# Patient Record
Sex: MALE | Race: WHITE | NOT HISPANIC OR LATINO | Employment: FULL TIME | RURAL
[De-identification: names, ages, dates, MRNs, and addresses within clinical notes are randomized per-mention and may not be internally consistent; named-entity substitution may affect disease eponyms.]

---

## 2017-05-15 ENCOUNTER — CONVERSION ENCOUNTER (OUTPATIENT)
Dept: FAMILY MEDICINE CLINIC | Facility: CLINIC | Age: 62
End: 2017-05-15

## 2017-11-22 ENCOUNTER — CONVERSION ENCOUNTER (OUTPATIENT)
Dept: FAMILY MEDICINE CLINIC | Facility: CLINIC | Age: 62
End: 2017-11-22

## 2018-01-17 ENCOUNTER — TREATMENT (OUTPATIENT)
Dept: PHYSICAL THERAPY | Facility: CLINIC | Age: 63
End: 2018-01-17

## 2018-01-17 DIAGNOSIS — M54.50 LOW BACK PAIN WITHOUT SCIATICA, UNSPECIFIED BACK PAIN LATERALITY, UNSPECIFIED CHRONICITY: Primary | ICD-10-CM

## 2018-01-17 PROCEDURE — 97110 THERAPEUTIC EXERCISES: CPT | Performed by: PHYSICAL THERAPIST

## 2018-01-17 PROCEDURE — 97035 APP MDLTY 1+ULTRASOUND EA 15: CPT | Performed by: PHYSICAL THERAPIST

## 2018-01-17 PROCEDURE — 97161 PT EVAL LOW COMPLEX 20 MIN: CPT | Performed by: PHYSICAL THERAPIST

## 2018-01-17 NOTE — PROGRESS NOTES
Physical Therapy Initial Evaluation and Plan of Care    TIME IN 11:40 TIME OUT 12:30    Patient: Cuco Salazar   : 1955  Diagnosis/ICD-10 Code:  No primary diagnosis found.  Referring practitioner: No ref. provider found  Date of Initial Visit: 2018  Today's Date: 2018  Patient seen for Visit count could not be calculated. Make sure you are using a visit which is associated with an episode. sessions           Subjective Questionnaire: Oswestry: 54%      Subjective Evaluation    History of Present Illness  Date of onset: 2015  Mechanism of injury: Patient reports he had driven for 2 days and then biked and played tennis. Woke up the next morning and couldn't move.  Spent 2 days in bed.  Since then, intermittent pain and stiffness on right side low back.  Spasms are worst complaint.  Also having left plantar fasciitis after walking a lot on previous vacation.    Subjective comment: Denies numbess or tingling. No giving way.  Worse when he gets out of bed, sitting too long, twists to left with tennis while lunging forward. Saw PMD, 2 PT's, and massage therapist.  No tests yet.Pain  Location: right quadratus/glut med area  Quality: sharp, squeezing and pulling  Relieving factors: change in position (supine, massage ball but now hurting in left side)  Exacerbated by: twist to left with lunge in tennis, sitting rotation, sit too long, walk too far, carry heavy item, landing on running if flared.  Ok with stairs.   Progression: no change    Diagnostic Tests  Abnormal x-ray: 20 years ago, arthritis.    Treatments  Previous treatment: physical therapy and massage  Current treatment: physical therapy  Patient Goals  Patient goals for therapy: decreased pain and increased motion  Patient goal: Decrease spasm and pain           Objective     Special Questions      Additional Special Questions  Denies bowel or bladder dysfunction.  (-) valsalva  No night pain.      Postural Observations    Additional  Postural Observation Details  Right upslip, AINN, outflare.    Palpation     Right Tenderness of the quadratus lumborum.     Additional Palpation Details  Slight tenderness quadratus, none at spine, none at sacrum.    Neurological Testing     Sensation     Lumbar   Left   Intact: light touch    Right   Intact: light touch    Reflexes   Left   Patellar (L4): normal (2+)  Achilles (S1): normal (2+)    Right   Patellar (L4): normal (2+)  Achilles (S1): normal (2+)    Active Range of Motion     Lumbar   Flexion: 90 degrees   Extension: 15 (mild at back of right hip) degrees with pain  Left lateral flexion: 14 degrees with pain  Right lateral flexion: 25 degrees   Left rotation: 25 (%) degrees   Right rotation: 25 degrees     Strength/Myotome Testing     Right Hip   Planes of Motion   Flexion: 4-  Extension: 5  Abduction: 4  External rotation: 4+  Internal rotation: 4    Additional Strength Details  QL 5/5, mild discomfort with stretch.  Pain recreated with supine crossover and return to extension.    Tests       Thoracic   Negative slump.     Lumbar     Right   Negative femoral stretch, passive SLR, lumbar push and valsalva.     Right Pelvic Girdle/Sacrum   Negative: sacrum compression, gapping and thigh thrust.     Right Hip   Negative Gaenslen's.     Additional Tests Details  (-) active SLR         Assessment & Plan     Assessment  Impairments: abnormal muscle firing, abnormal or restricted ROM, activity intolerance, impaired physical strength, lacks appropriate home exercise program and pain with function  Assessment details: Patient is a 62 y.o. male who has had pain and back spasms for 3 years.   He denies any paresthesias or radicular symptoms.  No recent diagnostics, no red flags noted on history or exam.  His pain is mostly in right lower quadrant and hip.  On exam, myotomes and dermatomes intact.  Lumbar spine and hip exam revealed no pain or instability.   He had SI malalignment and weakness in the right hip.  Pain with stretching hip or lumbar area. On palpation, he had mild tenderness around glut med and quadratus.   He presented with a SI dysfunction with right ilial upslip, anterior innominate, and pelvic inflare.     Functional Limitations: carrying objects, lifting and sleeping  Goals  Plan Goals: STG X 2 weeks  1.  Patient will demonstrate correct alignment 50% of the time.  2.  Patient will tolerate initial stabilization exercises.  3.  Increase hip strength 1/2 grade.  4.  Patient will demonstrate correct body mechanics and posture with cueing.  LTG X 6 weeks  1.  Patient to have normal pelvic alignment.  2.  5/5 hip and core strength.  3.  Full ROM with hips and lumbar spine.  4.  (-) special tests.  5.  Patient to return to tennis with tolerable symptoms while staying in normal pelvic alignment.      Plan  Therapy options: will be seen for skilled physical therapy services  Planned modality interventions: cryotherapy, iontophoresis, TENS, high voltage pulsed current (pain management) and ultrasound  Planned therapy interventions: abdominal trunk stabilization, manual therapy, flexibility, home exercise program, stretching, neuromuscular re-education and postural training  Frequency: 1x week  Duration in weeks: 6  Treatment plan discussed with: patient        Manual Therapy:    8     mins  28616;  Therapeutic Exercise:    15     mins  40897;     Neuromuscular Bhavik:         mins  36633;    Therapeutic Activity:           mins  70923;     Gait Training:            mins  57512;     Ultrasound:           mins  67797;    Electrical Stimulation:          mins  99821 ( );  Dry Needling           mins self-pay    Timed Treatment:   23   mins   Total Treatment:     50   mins    PT SIGNATURE: Jesica Medrano, PT   DATE TREATMENT INITIATED: 1/17/2018    Initial Certification  Certification Period: 4/17/2018  I certify that the therapy services are furnished while this patient is under my care.  The services outlined  above are required by this patient, and will be reviewed every 90 days.     PHYSICIAN:       DATE:     Please sign and return via fax to [phone].. Thank you, [facility].

## 2018-01-17 NOTE — PATIENT INSTRUCTIONS
Patient was educated on findings of evaluation, purpose of treatment, and goals for therapy.  Treatment options discussed and questions answered.  Patient was educated on exercises/self treatment/pain relief techniques.  Please view [facility] Thien Salazar for a complete list of HEP instructions.

## 2018-01-24 ENCOUNTER — TREATMENT (OUTPATIENT)
Dept: PHYSICAL THERAPY | Facility: CLINIC | Age: 63
End: 2018-01-24

## 2018-01-24 DIAGNOSIS — M54.50 LOW BACK PAIN WITHOUT SCIATICA, UNSPECIFIED BACK PAIN LATERALITY, UNSPECIFIED CHRONICITY: ICD-10-CM

## 2018-01-24 DIAGNOSIS — M72.2 PLANTAR FASCIITIS, LEFT: Primary | ICD-10-CM

## 2018-01-24 PROCEDURE — 97110 THERAPEUTIC EXERCISES: CPT | Performed by: PHYSICAL THERAPIST

## 2018-01-24 PROCEDURE — 97530 THERAPEUTIC ACTIVITIES: CPT | Performed by: PHYSICAL THERAPIST

## 2018-01-24 PROCEDURE — 97140 MANUAL THERAPY 1/> REGIONS: CPT | Performed by: PHYSICAL THERAPIST

## 2018-01-24 NOTE — PROGRESS NOTES
Physical Therapy Initial Evaluation and Plan of Care    TIME IN 10:30 TIME OUT 11:30    Patient: Thien Salazar   : 1955  Diagnosis/ICD-10 Code:  Plantar fasciitis, left [M72.2]  Referring practitioner: Dr. Gagan Mendes  Date of Initial Visit: 2018  Today's Date: 2018  Patient seen for 1 sessions             Subjective Evaluation    History of Present Illness  Mechanism of injury: During trip a lot of walking 7-12 miles a day.  H/o achilles tendonitis but noticed heel pain after the trip.  Did see a PT and was given stretches. Had custom orthotics.  They are the half arch size and very firm.  Don't fit in his tennis or dress shoes very well. Saw an orthopedic last month. Cortisone shot in arch where there was a knot, no improvement. Shot was more uncomfortable than the plantar fascia.          Patient Occupation: [employer]  Location: left heel  Quality: sharp and burning  Alleviating factors: Crocs at home, plantarfascia resting night sock, wearing orthotics help a little.  Aggravating factors: standing (standing worse than walking.  Wearing dress shoes..)    Diagnostic Tests  X-ray: normal    Treatments  Previous treatment: injection treatment and physical therapy  Current treatment: physical therapy  Patient Goals  Patient goals for therapy: decreased pain  Patient goal: Stand, ADL's, and sports with decreased pain.           Objective       Static Posture     Ankle/Foot   Ankle/Foot (Left): Calcaneovarus, pes cavus and supinated.     Neurological Testing     Sensation     Ankle/Foot   Left Ankle/Foot   Intact: light touch    Right Ankle/Foot   Intact: light touch     Comments   Left light touch: diminished over dorsum of left foot.     Active Range of Motion   Left Ankle/Foot   Dorsiflexion (ke): 15 degrees   Dorsiflexion (kf): 21 degrees   Plantar flexion: 76 degrees   Inversion: 24 degrees   Eversion: 12 degrees     Right Ankle/Foot   Dorsiflexion (ke): 15 degrees   Dorsiflexion (kf): 18  degrees   Plantar flexion: 72 degrees   Inversion: 30 degrees   Eversion: 10 degrees     Strength/Myotome Testing     Left Ankle/Foot   Dorsiflexion: 5  Plantar flexion: 4  Inversion: 4  Eversion: 4+    Right Ankle/Foot   Normal strength    Tests   Left Ankle/Foot   Negative for navicular drop, Tinel's sign (tarsal tunnel) and windlass.     General Comments     Ankle/Foot Comments   No remarkable edema           Assessment & Plan     Assessment  Impairments: activity intolerance, impaired physical strength and pain with function  Assessment details: Patient is a 63 yo old male with c/o left heel pain that has not responded to previous treatment.  He presents with good flexibility in the achilles but does have weakness in the ankle.  He has tenderness along the origin of the plantar fascia at the calcaneus. Based on the above findings, he is a good candidate for therapy.  Prognosis: good  Functional Limitations: walking and standing  Goals  Plan Goals: STG X4 weeks  1. Tolerate new exercises without increase in symptoms.  2.  Decrease tenderness 25%.  3.  Increase ankle stability by 1/2 grade.  LTGX8 weeks  1.  Increase standing time by 30 minutes.  2.  5/5 strength.  3.  Decreased pain and tenderness at least 50%.    Plan  Therapy options: will be seen for skilled physical therapy services  Planned modality interventions: cryotherapy, iontophoresis and ultrasound  Planned therapy interventions: balance/weight-bearing training, manual therapy, stretching and strengthening  Frequency: 1x week  Duration in weeks: 6  Plan details: 6-8 weeks        Manual Therapy:          mins  76303;  Therapeutic Exercise:          mins  96223;     Neuromuscular Bhavik:         mins  62460;    Therapeutic Activity:     30      mins  18874;     Gait Training:            mins  27676;     Ultrasound:     6/8     mins  03996;    Electrical Stimulation:          mins  36501 ( );  Dry Needling           mins self-pay    Timed Treatment:    38   mins   Total Treatment:         mins    PT SIGNATURE: Jesica Medrano, PT   DATE TREATMENT INITIATED: 1/24/2018    Initial Certification  Certification Period: 4/24/2018  I certify that the therapy services are furnished while this patient is under my care.  The services outlined above are required by this patient, and will be reviewed every 90 days.     PHYSICIAN:       DATE:     Please sign and return via fax to [phone].. Thank you, [facility].

## 2018-01-24 NOTE — PROGRESS NOTES
Physical Therapy Daily Progress Note    Time In 10:30  Time Out 11:30    Visit # : 2  Thien Salazar reports: noted anterior soreness.  Less left low back this week.  Feel it with serving to the right.    Subjective     Objective   See Exercise, Manual, and Modality Logs for complete treatment.   Right ASIS and iliac crest superior, malleolus  Slight PSIS superior   No apparent outflare    Assessment/Plan  Chronic quadratus tightness leading to right upslip.  Improved with manual but will superior position on the right.  No increase in spasms with initiation of core exercises, therefore able to advance today to more challenging exercises. Instructed patient in long leg distraction technique for family member  Progress strengthening /stabilization /functional activity  Add PF DFM and taping next visit.  Then patient out of town.         Manual Therapy:   10     mins  16105;  Therapeutic Exercise:    12    mins  59623;     Neuromuscular Bhavik:         mins  31855;    Therapeutic Activity:           mins  51574;     Gait Training:            mins  87142;     Ultrasound:           mins  49474;    Electrical Stimulation:          mins  77931 ( );  Dry Needling           mins self-pay    Timed Treatment:   22   mins   Total Treatment:     60   mins    Jesica Medrano, PT  Physical Therapist

## 2018-01-31 ENCOUNTER — TREATMENT (OUTPATIENT)
Dept: PHYSICAL THERAPY | Facility: CLINIC | Age: 63
End: 2018-01-31

## 2018-01-31 DIAGNOSIS — M54.50 LOW BACK PAIN WITHOUT SCIATICA, UNSPECIFIED BACK PAIN LATERALITY, UNSPECIFIED CHRONICITY: ICD-10-CM

## 2018-01-31 DIAGNOSIS — M72.2 PLANTAR FASCIITIS, LEFT: Primary | ICD-10-CM

## 2018-01-31 PROCEDURE — 97035 APP MDLTY 1+ULTRASOUND EA 15: CPT | Performed by: PHYSICAL THERAPIST

## 2018-01-31 PROCEDURE — 97140 MANUAL THERAPY 1/> REGIONS: CPT | Performed by: PHYSICAL THERAPIST

## 2018-01-31 PROCEDURE — 97014 ELECTRIC STIMULATION THERAPY: CPT | Performed by: PHYSICAL THERAPIST

## 2018-01-31 PROCEDURE — 97110 THERAPEUTIC EXERCISES: CPT | Performed by: PHYSICAL THERAPIST

## 2018-01-31 NOTE — PROGRESS NOTES
Physical Therapy Daily Progress Note    Time In 10:30  Time Out 11:40    Visit # : 3  Thien Salazar reports: occasional back spasms.  Less frequent.  Still random.  Will be with odd movement sitting.  Never when I play tennis.      Subjective     Objective   See Exercise, Manual, and Modality Logs for complete treatment.        Assessment/Plan  Right AINN present but slight.  Quadratus tone with a lot of guarding.  One area of knot in plantar fascia diminished after soft tissue work.  Progress per Plan of Care  Patient out of town and then will assess manual on foot, taping.  Progress core as needed  Waiting on ionto order from Dr. Mendes         Manual Therapy:    20     mins  47259;  Therapeutic Exercise:    15     mins  53988;     Neuromuscular Bhavik:         mins  37924;    Therapeutic Activity:           mins  31360;     Gait Training:            mins  85754;     Ultrasound:     6/8     mins  08928;    Electrical Stimulation:    15     mins  97789 ( );  Dry Needling           mins self-pay    Timed Treatment:   43   mins   Total Treatment:     70   mins    Jesica Medrano, PT  Physical Therapist

## 2018-02-14 ENCOUNTER — TREATMENT (OUTPATIENT)
Dept: PHYSICAL THERAPY | Facility: CLINIC | Age: 63
End: 2018-02-14

## 2018-02-14 DIAGNOSIS — M54.50 LOW BACK PAIN WITHOUT SCIATICA, UNSPECIFIED BACK PAIN LATERALITY, UNSPECIFIED CHRONICITY: ICD-10-CM

## 2018-02-14 DIAGNOSIS — M72.2 PLANTAR FASCIITIS, LEFT: Primary | ICD-10-CM

## 2018-02-14 PROCEDURE — 97140 MANUAL THERAPY 1/> REGIONS: CPT | Performed by: PHYSICAL THERAPIST

## 2018-02-14 PROCEDURE — 97112 NEUROMUSCULAR REEDUCATION: CPT | Performed by: PHYSICAL THERAPIST

## 2018-02-14 PROCEDURE — 97014 ELECTRIC STIMULATION THERAPY: CPT | Performed by: PHYSICAL THERAPIST

## 2018-02-14 NOTE — PROGRESS NOTES
Physical Therapy Daily Progress Note    Time In 11:00  Time Out 11:59    Visit # : 4  Thien Salazar reports: serving is what is hurting my hip.Heat helps but I burnt my back with a heat patch on.  Flying home Saturday and ran through the airport with 45lbs. Overall less spasms.  More foot pain with tennis every day while on vacation.  I won't be playing for a month. Manual improved heel last visit.     Subjective     Objective   See Exercise, Manual, and Modality Logs for complete treatment.       Assessment/Plan  Exacerbated back while on vacation playing tennis.  Quadratus lumborum with extreme tone, tenderness ql and left lumbar paraspinal.  Progress per Plan of Care  Bridge with hamstring curls next visit when acute exacerbation improved.  PNF trunk extenion and right posterior rotation (counter balance serving motion of flexion, anterior innominate rotation  Continue weekly         Manual Therapy:    20    mins  18569;  Therapeutic Exercise:          mins  57249;     Neuromuscular Bhavik:  8       mins  65400;    Therapeutic Activity:           mins  61578;     Gait Training:            mins  88220;     Ultrasound:      6/8     mins  58133;    Electrical Stimulation:    20     mins  75129 ( );  Dry Needling           mins self-pay    Timed Treatment:   36  mins   Total Treatment:     59   mins    Jesica Medrano, PT  Physical Therapist

## 2018-02-21 ENCOUNTER — TREATMENT (OUTPATIENT)
Dept: PHYSICAL THERAPY | Facility: CLINIC | Age: 63
End: 2018-02-21

## 2018-02-21 DIAGNOSIS — M72.2 PLANTAR FASCIITIS, LEFT: Primary | ICD-10-CM

## 2018-02-21 DIAGNOSIS — M54.50 LOW BACK PAIN WITHOUT SCIATICA, UNSPECIFIED BACK PAIN LATERALITY, UNSPECIFIED CHRONICITY: ICD-10-CM

## 2018-02-21 PROCEDURE — 97110 THERAPEUTIC EXERCISES: CPT | Performed by: PHYSICAL THERAPIST

## 2018-02-21 PROCEDURE — 97140 MANUAL THERAPY 1/> REGIONS: CPT | Performed by: PHYSICAL THERAPIST

## 2018-02-21 NOTE — PROGRESS NOTES
Physical Therapy Daily Progress Note    Time In 11:25  Time Out 12:05    Visit # : 5  Thien Salazar reports: back stopped spasming.  Everything better, back about 65% better, foot is improving but more slowly.  Using compression sock and it seems to help. I haven't had a chance to do the ball exercise or the single leg balance.  I am doing all the others without difficulty.    Subjective     Objective       Tests     Right Hip   Negative Gaenslen's, scour, SI compression and SI distraction.   Slight tenderness at quadratus lumborum  See Exercise, Manual, and Modality Logs for complete treatment.   Slight iliac crest upslip, right anterior innominate, and inflare.  Reviewed home exercises.      Assessment/Plan  SI rotation corrected.  Much less malalignment as spasms are not occurring as frequently.  Decreased tenderness at heel with palpation but still small area.   Progress per Plan of Care           Manual Therapy:    20    mins  38792;  Therapeutic Exercise:    10     mins  55290;     Neuromuscular Bhavik:         mins  24509;    Therapeutic Activity:           mins  41813;     Gait Training:            mins  32772;     Ultrasound:     6/8     mins  10643;    Electrical Stimulation:          mins  69270 ( );  Dry Needling           mins self-pay    Timed Treatment:   38   mins   Total Treatment:     40   mins    Jesica Medrano, PT  Physical Therapist

## 2018-02-28 ENCOUNTER — TREATMENT (OUTPATIENT)
Dept: PHYSICAL THERAPY | Facility: CLINIC | Age: 63
End: 2018-02-28

## 2018-02-28 DIAGNOSIS — M54.50 LOW BACK PAIN WITHOUT SCIATICA, UNSPECIFIED BACK PAIN LATERALITY, UNSPECIFIED CHRONICITY: ICD-10-CM

## 2018-02-28 DIAGNOSIS — M72.2 PLANTAR FASCIITIS, LEFT: Primary | ICD-10-CM

## 2018-02-28 PROCEDURE — 97110 THERAPEUTIC EXERCISES: CPT | Performed by: PHYSICAL THERAPIST

## 2018-02-28 PROCEDURE — 97014 ELECTRIC STIMULATION THERAPY: CPT | Performed by: PHYSICAL THERAPIST

## 2018-02-28 PROCEDURE — 97035 APP MDLTY 1+ULTRASOUND EA 15: CPT | Performed by: PHYSICAL THERAPIST

## 2018-02-28 PROCEDURE — 97140 MANUAL THERAPY 1/> REGIONS: CPT | Performed by: PHYSICAL THERAPIST

## 2018-02-28 NOTE — PROGRESS NOTES
Physical Therapy Daily Progress Note    Time In 10:30  Time Out 11:30    Visit # : 6  Thien Salazar reports: my back/hip is getting better.  I have been having spasms in the lower sacral muscles at night.  Played tennis last Friday, no back pain.  Foot started hurt after an hour or so.    Subjective     Objective   See Exercise, Manual, and Modality Logs for complete treatment.   Slight right AINN, upslip.  No inflare.  Tenderness at quadratus with significant guarding.  Decreased plantarfasica and calcaneal tenderness.    Assessment/Plan  Patient with improving sacroilliac rotation and decreasing spasms.  Improving tissue mobility in plantar fascia.  Progress strengthening /stabilization /functional activity   Assess new stretches.  Add cook hip lift on right, hip walks - abd and ext         Manual Therapy:    15     mins  58272;  Therapeutic Exercise:    10     mins  86166;     Neuromuscular Bhavik:    5     mins  56555;    Therapeutic Activity:           mins  47278;     Gait Training:            mins  72509;     Ultrasound:     6/8     mins  59366;    Electrical Stimulation:    15     mins  84448 ( );  Dry Needling           mins self-pay    Timed Treatment:   38  mins   Total Treatment:     60   mins    Jesica Medrano, PT  Physical Therapist

## 2018-06-15 ENCOUNTER — CONVERSION ENCOUNTER (OUTPATIENT)
Dept: FAMILY MEDICINE CLINIC | Facility: CLINIC | Age: 63
End: 2018-06-15

## 2018-09-27 ENCOUNTER — TRANSCRIBE ORDERS (OUTPATIENT)
Dept: ADMINISTRATIVE | Facility: HOSPITAL | Age: 63
End: 2018-09-27

## 2018-09-27 DIAGNOSIS — M25.551 RIGHT HIP PAIN: Primary | ICD-10-CM

## 2018-10-09 ENCOUNTER — HOSPITAL ENCOUNTER (OUTPATIENT)
Dept: GENERAL RADIOLOGY | Facility: HOSPITAL | Age: 63
Discharge: HOME | End: 2018-10-09
Attending: ORTHOPAEDIC SURGERY | Admitting: ORTHOPAEDIC SURGERY

## 2018-10-09 ENCOUNTER — HOSPITAL ENCOUNTER (OUTPATIENT)
Dept: MRI IMAGING | Facility: HOSPITAL | Age: 63
Discharge: HOME | End: 2018-10-09
Attending: ORTHOPAEDIC SURGERY

## 2018-10-09 DIAGNOSIS — M25.551 RIGHT HIP PAIN: ICD-10-CM

## 2018-10-09 PROCEDURE — 77002 NEEDLE LOCALIZATION BY XRAY: CPT

## 2018-10-09 PROCEDURE — 0 GADOBENATE DIMEGLUMINE 529 MG/ML SOLUTION: Performed by: RADIOLOGY

## 2018-10-09 PROCEDURE — 25010000002 IOPAMIDOL 61 % SOLUTION: Performed by: RADIOLOGY

## 2018-10-09 PROCEDURE — A9577 INJ MULTIHANCE: HCPCS | Performed by: RADIOLOGY

## 2018-10-09 PROCEDURE — 73722 MRI JOINT OF LWR EXTR W/DYE: CPT

## 2018-10-09 RX ORDER — LIDOCAINE HYDROCHLORIDE 10 MG/ML
10 INJECTION, SOLUTION INFILTRATION; PERINEURAL ONCE
Status: COMPLETED | OUTPATIENT
Start: 2018-10-09 | End: 2018-10-09

## 2018-10-09 RX ADMIN — LIDOCAINE HYDROCHLORIDE 3 ML: 10 INJECTION, SOLUTION INFILTRATION; PERINEURAL at 09:14

## 2018-10-09 RX ADMIN — IOPAMIDOL 5 ML: 612 INJECTION, SOLUTION INTRAVENOUS at 09:14

## 2018-10-09 RX ADMIN — GADOBENATE DIMEGLUMINE 0.05 ML: 529 INJECTION, SOLUTION INTRAVENOUS at 09:14

## 2018-12-03 ENCOUNTER — CONVERSION ENCOUNTER (OUTPATIENT)
Dept: FAMILY MEDICINE CLINIC | Facility: CLINIC | Age: 63
End: 2018-12-03

## 2019-06-04 VITALS
DIASTOLIC BLOOD PRESSURE: 76 MMHG | RESPIRATION RATE: 16 BRPM | RESPIRATION RATE: 16 BRPM | HEART RATE: 72 BPM | OXYGEN SATURATION: 98 % | HEIGHT: 69 IN | RESPIRATION RATE: 18 BRPM | DIASTOLIC BLOOD PRESSURE: 75 MMHG | DIASTOLIC BLOOD PRESSURE: 81 MMHG | WEIGHT: 200 LBS | WEIGHT: 199.4 LBS | WEIGHT: 196 LBS | HEIGHT: 69 IN | SYSTOLIC BLOOD PRESSURE: 127 MMHG | SYSTOLIC BLOOD PRESSURE: 115 MMHG | DIASTOLIC BLOOD PRESSURE: 82 MMHG | BODY MASS INDEX: 29.53 KG/M2 | BODY MASS INDEX: 29.03 KG/M2 | OXYGEN SATURATION: 98 % | BODY MASS INDEX: 29.35 KG/M2 | HEART RATE: 70 BPM | RESPIRATION RATE: 16 BRPM | HEIGHT: 69 IN | WEIGHT: 198.13 LBS | HEART RATE: 93 BPM | SYSTOLIC BLOOD PRESSURE: 134 MMHG | OXYGEN SATURATION: 100 % | HEART RATE: 67 BPM | OXYGEN SATURATION: 98 % | SYSTOLIC BLOOD PRESSURE: 130 MMHG

## 2019-08-22 RX ORDER — BACLOFEN 10 MG/1
10 TABLET ORAL 3 TIMES DAILY
Qty: 270 TABLET | Refills: 1 | Status: SHIPPED | OUTPATIENT
Start: 2019-08-22 | End: 2020-11-04 | Stop reason: SDUPTHER

## 2019-11-08 ENCOUNTER — OFFICE VISIT (OUTPATIENT)
Dept: FAMILY MEDICINE CLINIC | Facility: CLINIC | Age: 64
End: 2019-11-08

## 2019-11-08 VITALS
BODY MASS INDEX: 26.41 KG/M2 | TEMPERATURE: 98.4 F | SYSTOLIC BLOOD PRESSURE: 120 MMHG | RESPIRATION RATE: 16 BRPM | DIASTOLIC BLOOD PRESSURE: 84 MMHG | WEIGHT: 195 LBS | HEIGHT: 72 IN | OXYGEN SATURATION: 99 % | HEART RATE: 78 BPM

## 2019-11-08 DIAGNOSIS — J01.11 ACUTE RECURRENT FRONTAL SINUSITIS: Primary | ICD-10-CM

## 2019-11-08 PROBLEM — J30.9 ALLERGIC RHINITIS: Status: ACTIVE | Noted: 2019-11-08

## 2019-11-08 PROBLEM — M54.31 SCIATICA OF RIGHT SIDE: Status: ACTIVE | Noted: 2017-05-15

## 2019-11-08 PROBLEM — E66.3 OVERWEIGHT: Status: ACTIVE | Noted: 2018-06-15

## 2019-11-08 PROBLEM — R42 DIZZINESS: Status: ACTIVE | Noted: 2017-12-04

## 2019-11-08 PROBLEM — M25.511 PAIN IN RIGHT SHOULDER: Status: ACTIVE | Noted: 2018-12-03

## 2019-11-08 PROBLEM — M79.642 HAND PAIN, LEFT: Status: ACTIVE | Noted: 2017-11-22

## 2019-11-08 PROBLEM — L25.9 CONTACT DERMATITIS: Status: ACTIVE | Noted: 2018-06-15

## 2019-11-08 PROBLEM — E78.5 HYPERLIPIDEMIA: Status: ACTIVE | Noted: 2019-11-08

## 2019-11-08 PROBLEM — IMO0002 DEGENERATION OF INTERVERTEBRAL DISC: Status: ACTIVE | Noted: 2019-11-08

## 2019-11-08 PROCEDURE — 99213 OFFICE O/P EST LOW 20 MIN: CPT | Performed by: FAMILY MEDICINE

## 2019-11-08 RX ORDER — ASPIRIN 81 MG/1
TABLET ORAL
COMMUNITY
Start: 2014-12-09

## 2019-11-08 RX ORDER — SULFAMETHOXAZOLE AND TRIMETHOPRIM 800; 160 MG/1; MG/1
1 TABLET ORAL 2 TIMES DAILY
Qty: 20 TABLET | Refills: 0 | Status: SHIPPED | OUTPATIENT
Start: 2019-11-08 | End: 2019-11-13 | Stop reason: SDUPTHER

## 2019-11-08 NOTE — ASSESSMENT & PLAN NOTE
Increase fluids. Tylenol and Advil prn. Report worsening or persistence of symptoms. Discussed medications to help with symptoms of upper respiratory infection including cough suppressants, decongestants, anti-inflammatory medications, antihistamines, and antipyretics.  Patient was given a list of medications with appropriate dosages. Adverse effects discussed.  Avoid alcohol, driving, or operating machinery while taking medications.

## 2019-11-08 NOTE — PROGRESS NOTES
"URI    This is a new problem. The current episode started 1 to 4 weeks ago. The problem has been gradually worsening. There has been no fever. Associated symptoms include congestion, coughing, headaches, rhinorrhea, sinus pain, sneezing, a sore throat and wheezing. Pertinent negatives include no abdominal pain, chest pain, diarrhea, dysuria, ear pain, joint pain, joint swelling, nausea or rash. He has tried antihistamine, NSAIDs, increased fluids and decongestant for the symptoms. The treatment provided mild relief.     Past Medical History:   Diagnosis Date   • Asthma    • H/O knee surgery    • Injury of back      History reviewed. No pertinent surgical history.  Family History   Problem Relation Age of Onset   • No Known Problems Mother    • No Known Problems Father      Social History     Tobacco Use   • Smoking status: Never Smoker   • Smokeless tobacco: Never Used   Substance Use Topics   • Alcohol use: Yes     Alcohol/week: 1.8 oz     Types: 3 Glasses of wine per week     PHQ-2 Depression Screening  Little interest or pleasure in doing things? 0   Feeling down, depressed, or hopeless? 0   PHQ-2 Total Score 0       Review of Systems   HENT: Positive for congestion, rhinorrhea, sneezing and sore throat. Negative for ear pain.    Respiratory: Positive for cough and wheezing.    Cardiovascular: Negative for chest pain.   Gastrointestinal: Negative for abdominal pain, diarrhea and nausea.   Genitourinary: Negative for dysuria.   Musculoskeletal: Negative for joint pain.   Skin: Negative for rash.     Vitals:    11/08/19 0838   BP: 120/84   BP Location: Left arm   Patient Position: Sitting   Cuff Size: Adult   Pulse: 78   Resp: 16   Temp: 98.4 °F (36.9 °C)   TempSrc: Oral   SpO2: 99%   Weight: 88.5 kg (195 lb)   Height: 182.9 cm (72\")     Body mass index is 26.45 kg/m².  Physical Exam   Constitutional: He is oriented to person, place, and time. He appears well-developed and well-nourished. No distress.   HENT: "   Head: Normocephalic and atraumatic.   Right Ear: External ear normal.   Left Ear: External ear normal.   Nose: Nose normal.   Mouth/Throat: Oropharynx is clear and moist.   Eyes: EOM are normal. Pupils are equal, round, and reactive to light.   Neck: Normal range of motion. Neck supple.   Cardiovascular: Normal rate, regular rhythm and normal heart sounds.   No murmur heard.  Pulmonary/Chest: Effort normal and breath sounds normal. He has no wheezes. He has no rales.   Abdominal: Soft. Bowel sounds are normal. He exhibits no distension.   Musculoskeletal: Normal range of motion.   Neurological: He is alert and oriented to person, place, and time.   Skin: Skin is warm and dry.   Psychiatric: He has a normal mood and affect. His behavior is normal.     No visits with results within 7 Day(s) from this visit.   Latest known visit with results is:   No results found for any previous visit.         Diagnoses and all orders for this visit:    1. Acute recurrent frontal sinusitis (Primary)  Assessment & Plan:  Increase fluids. Tylenol and Advil prn. Report worsening or persistence of symptoms. Discussed medications to help with symptoms of upper respiratory infection including cough suppressants, decongestants, anti-inflammatory medications, antihistamines, and antipyretics.  Patient was given a list of medications with appropriate dosages. Adverse effects discussed.  Avoid alcohol, driving, or operating machinery while taking medications.       Other orders  -     sulfamethoxazole-trimethoprim (BACTRIM DS) 800-160 MG per tablet; Take 1 tablet by mouth 2 (Two) Times a Day.  Dispense: 20 tablet; Refill: 0  -     HYDROcodone-homatropine (HYCODAN) 5-1.5 MG/5ML syrup; Take 5 mL by mouth Every 6 (Six) Hours As Needed for Cough.  Dispense: 240 mL; Refill: 0

## 2019-11-13 RX ORDER — SULFAMETHOXAZOLE AND TRIMETHOPRIM 800; 160 MG/1; MG/1
1 TABLET ORAL 2 TIMES DAILY
Qty: 20 TABLET | Refills: 0 | Status: SHIPPED | OUTPATIENT
Start: 2019-11-13

## 2020-11-04 RX ORDER — BACLOFEN 10 MG/1
10 TABLET ORAL 3 TIMES DAILY
Qty: 270 TABLET | Refills: 1 | Status: SHIPPED | OUTPATIENT
Start: 2020-11-04 | End: 2022-01-06 | Stop reason: SDUPTHER

## 2022-01-06 RX ORDER — BACLOFEN 10 MG/1
10 TABLET ORAL 3 TIMES DAILY
Qty: 270 TABLET | Refills: 1 | Status: SHIPPED | OUTPATIENT
Start: 2022-01-06